# Patient Record
Sex: MALE | Race: BLACK OR AFRICAN AMERICAN | NOT HISPANIC OR LATINO | URBAN - METROPOLITAN AREA
[De-identification: names, ages, dates, MRNs, and addresses within clinical notes are randomized per-mention and may not be internally consistent; named-entity substitution may affect disease eponyms.]

---

## 2017-12-30 ENCOUNTER — EMERGENCY (EMERGENCY)
Facility: HOSPITAL | Age: 37
LOS: 1 days | Discharge: HOME | End: 2017-12-30
Attending: EMERGENCY MEDICINE | Admitting: EMERGENCY MEDICINE
Payer: MEDICARE

## 2017-12-30 VITALS
DIASTOLIC BLOOD PRESSURE: 73 MMHG | SYSTOLIC BLOOD PRESSURE: 119 MMHG | RESPIRATION RATE: 18 BRPM | TEMPERATURE: 99 F | OXYGEN SATURATION: 98 % | HEART RATE: 87 BPM

## 2017-12-30 VITALS
TEMPERATURE: 98 F | HEART RATE: 90 BPM | OXYGEN SATURATION: 100 % | RESPIRATION RATE: 16 BRPM | SYSTOLIC BLOOD PRESSURE: 138 MMHG | DIASTOLIC BLOOD PRESSURE: 81 MMHG

## 2017-12-30 DIAGNOSIS — S82.409A UNSPECIFIED FRACTURE OF SHAFT OF UNSPECIFIED FIBULA, INITIAL ENCOUNTER FOR CLOSED FRACTURE: Chronic | ICD-10-CM

## 2017-12-30 LAB
CRP SERPL-MCNC: < 5 MG/L — SIGNIFICANT CHANGE UP
ERYTHROCYTE [SEDIMENTATION RATE] IN BLOOD: 3 MM/HR — SIGNIFICANT CHANGE UP (ref 1–15)
RHEUMATOID FACT SERPL-ACNC: < 7 IU/ML — SIGNIFICANT CHANGE UP

## 2017-12-30 PROCEDURE — 99284 EMERGENCY DEPT VISIT MOD MDM: CPT | Mod: GC

## 2017-12-30 RX ORDER — ACETAMINOPHEN 500 MG
1000 TABLET ORAL ONCE
Qty: 0 | Refills: 0 | Status: COMPLETED | OUTPATIENT
Start: 2017-12-30 | End: 2017-12-30

## 2017-12-30 RX ADMIN — Medication 400 MILLIGRAM(S): at 23:45
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
## 2017-12-30 NOTE — ED PROVIDER NOTE - CARE PLAN
Principal Discharge DX:	Cellulitis of finger of right hand  Secondary Diagnosis:	Chronic ulcer of right leg, limited to breakdown of skin

## 2017-12-30 NOTE — ED PROVIDER NOTE - PROGRESS NOTE DETAILS
Sign out follow-up: Clear for outpt f/u with orthopedics per orthopedics review of XR and labs. Dr. Carrion. [phone]. Instructions given to pt and his brother.

## 2017-12-30 NOTE — ED ADULT TRIAGE NOTE - CHIEF COMPLAINT QUOTE
Pt arrives as xfer from [facility] as Plastics xfer. Pt c/o open, non healing sores to R hand 2nd digit, and R calf. Pt arrives with 20G IV in LAC.  See xfer paperwork for [facility] imaging and lab work reports. Pt arrives as xfer from [facility] as Plastics xfer. Pt c/o open, non healing sores to R hand 2nd digit, and R calf. Pt arrives with 20G IV in LAC.  See xfer paperwork for [facility] imaging and lab work reports as well as imaging CD's.

## 2017-12-30 NOTE — ED ADULT NURSE NOTE - OBJECTIVE STATEMENT
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 As per Pt Brother at bedside. They did all the blood work including wound cult on rt leg wound at [facility]. Pt with non healing wound on rt 2nd digit, and rt lower leg (. Rt leg wound 5cm by 2cm wide, open pink wound bed + purulent drainage noted on dressing. ) Pt c/o "tight burning type pain on rt lower leg wound." DSD applied. Pt calm pleasant laughting affect. Pt reports...." I do smoke week every day and I do drimk beer every day   apprx 2 40 onces.." Pt reports no previous hx of withdrawals . Last drank this AM. Handoff report to Primary RN Jasmyn to follow
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
## 2017-12-30 NOTE — ED PROVIDER NOTE - PHYSICAL EXAMINATION
3cm ulcerated lesion to R shin, no crepitus or drainage  2cm ulceration to R dorsal 2nd digit over PIP joint, ROM intact, sensation intact, no crepitus   0.25cm linear cut to right lower lip, no bleeding or ulceration yet  NO intraoral lesions

## 2017-12-30 NOTE — ED ADULT NURSE NOTE - CHIEF COMPLAINT QUOTE
Pt arrives as xfer from [facility] as Plastics xfer. Pt c/o open, non healing sores to R hand 2nd digit, and R calf. Pt arrives with 20G IV in LAC.  See xfer paperwork for [facility] imaging and lab work reports as well as imaging CD's.

## 2017-12-31 PROCEDURE — 73130 X-RAY EXAM OF HAND: CPT | Mod: 26,RT

## 2017-12-31 PROCEDURE — 73590 X-RAY EXAM OF LOWER LEG: CPT | Mod: 26,RT

## 2017-12-31 NOTE — ED ADULT NURSE REASSESSMENT NOTE - NS ED NURSE REASSESS COMMENT FT1
pt. a&ox3, came back from xr, noted Tylenol IV not yet done. pt. doesn't want to finish the Tylenol, wants it remove. pt. also wants his saline lock to be removed. appears to be getting agitated. pt. redirected. MD made aware.

## 2018-01-02 LAB — ANA TITR SER: NEGATIVE — SIGNIFICANT CHANGE UP
